# Patient Record
Sex: FEMALE | Race: WHITE | Employment: UNEMPLOYED | URBAN - NONMETROPOLITAN AREA
[De-identification: names, ages, dates, MRNs, and addresses within clinical notes are randomized per-mention and may not be internally consistent; named-entity substitution may affect disease eponyms.]

---

## 2022-01-15 ENCOUNTER — HOSPITAL ENCOUNTER (INPATIENT)
Age: 82
LOS: 3 days | Discharge: HOSPICE | DRG: 811 | End: 2022-01-18
Attending: EMERGENCY MEDICINE | Admitting: EMERGENCY MEDICINE
Payer: MEDICARE

## 2022-01-15 ENCOUNTER — APPOINTMENT (OUTPATIENT)
Dept: GENERAL RADIOLOGY | Age: 82
DRG: 811 | End: 2022-01-15
Attending: EMERGENCY MEDICINE
Payer: MEDICARE

## 2022-01-15 DIAGNOSIS — D64.9 ANEMIA, UNSPECIFIED TYPE: Primary | ICD-10-CM

## 2022-01-15 DIAGNOSIS — E87.6 HYPOKALEMIA: ICD-10-CM

## 2022-01-15 DIAGNOSIS — E16.2 HYPOGLYCEMIA: ICD-10-CM

## 2022-01-15 DIAGNOSIS — R41.82 ALTERED MENTAL STATUS, UNSPECIFIED ALTERED MENTAL STATUS TYPE: ICD-10-CM

## 2022-01-15 DIAGNOSIS — E86.0 DEHYDRATION: ICD-10-CM

## 2022-01-15 LAB
ABO + RH BLD: NORMAL
ALBUMIN SERPL-MCNC: 2 G/DL (ref 3.5–5)
ALBUMIN SERPL-MCNC: <0.6 G/DL (ref 3.5–5)
ALBUMIN/GLOB SERPL: 0.6 {RATIO} (ref 1.1–2.2)
ALBUMIN/GLOB SERPL: ABNORMAL {RATIO} (ref 1.1–2.2)
ALP SERPL-CCNC: 110 U/L (ref 45–117)
ALP SERPL-CCNC: 38 U/L (ref 45–117)
ALT SERPL-CCNC: 12 U/L (ref 12–78)
ALT SERPL-CCNC: <6 U/L (ref 12–78)
AMORPH CRY URNS QL MICRO: ABNORMAL
ANION GAP SERPL CALC-SCNC: 13 MMOL/L (ref 5–15)
ANION GAP SERPL CALC-SCNC: 16 MMOL/L (ref 5–15)
APPEARANCE UR: CLEAR
AST SERPL W P-5'-P-CCNC: 21 U/L (ref 15–37)
AST SERPL W P-5'-P-CCNC: 7 U/L (ref 15–37)
BACTERIA URNS QL MICRO: ABNORMAL /HPF
BASOPHILS # BLD: 0 K/UL (ref 0–0.2)
BASOPHILS # BLD: 0.1 K/UL (ref 0–0.2)
BASOPHILS NFR BLD: 0 % (ref 0–2.5)
BASOPHILS NFR BLD: 1 % (ref 0–2.5)
BILIRUB SERPL-MCNC: 0.5 MG/DL (ref 0.2–1)
BILIRUB SERPL-MCNC: <0.1 MG/DL (ref 0.2–1)
BILIRUB UR QL: NEGATIVE
BLOOD GROUP ANTIBODIES SERPL: NEGATIVE
BUN SERPL-MCNC: 10 MG/DL (ref 6–20)
BUN SERPL-MCNC: 24 MG/DL (ref 6–20)
BUN/CREAT SERPL: 23 (ref 12–20)
BUN/CREAT SERPL: 40 (ref 12–20)
CA-I BLD-MCNC: 6.6 MG/DL (ref 8.5–10.1)
CA-I BLD-MCNC: <5 MG/DL (ref 8.5–10.1)
CHLORIDE SERPL-SCNC: 106 MMOL/L (ref 97–108)
CHLORIDE SERPL-SCNC: 123 MMOL/L (ref 97–108)
CO2 SERPL-SCNC: 12 MMOL/L (ref 21–32)
CO2 SERPL-SCNC: 24 MMOL/L (ref 21–32)
COLOR UR: ABNORMAL
CREAT SERPL-MCNC: 0.25 MG/DL (ref 0.55–1.02)
CREAT SERPL-MCNC: 1.06 MG/DL (ref 0.55–1.02)
EOSINOPHIL # BLD: 0 K/UL (ref 0–0.7)
EOSINOPHIL # BLD: 0.1 K/UL (ref 0–0.7)
EOSINOPHIL NFR BLD: 1 % (ref 0.9–2.9)
EOSINOPHIL NFR BLD: 1 % (ref 0.9–2.9)
ERYTHROCYTE [DISTWIDTH] IN BLOOD BY AUTOMATED COUNT: 13.7 % (ref 11.5–14.5)
ERYTHROCYTE [DISTWIDTH] IN BLOOD BY AUTOMATED COUNT: 13.9 % (ref 11.5–14.5)
GLOBULIN SER CALC-MCNC: 3.2 G/DL (ref 2–4)
GLOBULIN SER CALC-MCNC: ABNORMAL G/DL (ref 2–4)
GLUCOSE BLD STRIP.AUTO-MCNC: 311 MG/DL (ref 65–117)
GLUCOSE SERPL-MCNC: 188 MG/DL (ref 65–100)
GLUCOSE SERPL-MCNC: 45 MG/DL (ref 65–100)
GLUCOSE UR STRIP.AUTO-MCNC: NEGATIVE MG/DL
HCT VFR BLD AUTO: 17.9 % (ref 36–46)
HCT VFR BLD AUTO: 38.6 % (ref 36–46)
HGB BLD-MCNC: 13.5 G/DL (ref 13.5–17.5)
HGB BLD-MCNC: 6 G/DL (ref 13.5–17.5)
HGB UR QL STRIP: ABNORMAL
KETONES UR QL STRIP.AUTO: NEGATIVE MG/DL
LACTATE SERPL-SCNC: 0.7 MMOL/L (ref 0.4–2)
LEUKOCYTE ESTERASE UR QL STRIP.AUTO: ABNORMAL
LYMPHOCYTES # BLD: 0.5 K/UL (ref 1–4.8)
LYMPHOCYTES # BLD: 0.9 K/UL (ref 1–4.8)
LYMPHOCYTES NFR BLD: 11 % (ref 20.5–51.1)
LYMPHOCYTES NFR BLD: 11 % (ref 20.5–51.1)
MCH RBC QN AUTO: 31.9 PG (ref 31–34)
MCH RBC QN AUTO: 32.2 PG (ref 31–34)
MCHC RBC AUTO-ENTMCNC: 33.4 G/DL (ref 31–36)
MCHC RBC AUTO-ENTMCNC: 34.9 G/DL (ref 31–36)
MCV RBC AUTO: 92.2 FL (ref 80–100)
MCV RBC AUTO: 95.5 FL (ref 80–100)
MONOCYTES # BLD: 0.4 K/UL (ref 0.2–2.4)
MONOCYTES # BLD: 0.9 K/UL (ref 0.2–2.4)
MONOCYTES NFR BLD: 10 % (ref 1.7–9.3)
MONOCYTES NFR BLD: 9 % (ref 1.7–9.3)
NEUTS SEG # BLD: 3.2 K/UL (ref 1.8–7.7)
NEUTS SEG # BLD: 6.5 K/UL (ref 1.8–7.7)
NEUTS SEG NFR BLD: 77 % (ref 42–75)
NEUTS SEG NFR BLD: 79 % (ref 42–75)
NITRITE UR QL STRIP.AUTO: POSITIVE
NRBC # BLD: 0.01 K/UL
NRBC # BLD: 0.03 K/UL
NRBC BLD-RTO: 0.2 PER 100 WBC
NRBC BLD-RTO: 0.8 PER 100 WBC
PERFORMED BY, TECHID: ABNORMAL
PH UR STRIP: 6 [PH] (ref 5–8)
PLATELET # BLD AUTO: 151 K/UL (ref 150–400)
PLATELET # BLD AUTO: 75 K/UL (ref 150–400)
PMV BLD AUTO: 8.4 FL (ref 6.5–11.5)
PMV BLD AUTO: 8.8 FL (ref 6.5–11.5)
POTASSIUM SERPL-SCNC: 3.1 MMOL/L (ref 3.5–5.1)
POTASSIUM SERPL-SCNC: <1 MMOL/L (ref 3.5–5.1)
PROT SERPL-MCNC: 5.2 G/DL (ref 6.4–8.2)
PROT SERPL-MCNC: <2 G/DL (ref 6.4–8.2)
PROT UR STRIP-MCNC: NEGATIVE MG/DL
RBC # BLD AUTO: 1.87 M/UL (ref 4.5–5.9)
RBC # BLD AUTO: 4.19 M/UL (ref 4.5–5.9)
RBC #/AREA URNS HPF: ABNORMAL /HPF (ref 0–3)
SODIUM SERPL-SCNC: 143 MMOL/L (ref 136–145)
SODIUM SERPL-SCNC: 151 MMOL/L (ref 136–145)
SP GR UR REFRACTOMETRY: 1.02 (ref 1–1.03)
SPECIMEN EXP DATE BLD: NORMAL
TROPONIN-HIGH SENSITIVITY: 29 NG/L (ref 0–51)
UROBILINOGEN UR QL STRIP.AUTO: 1 EU/DL (ref 0.2–1)
WBC # BLD AUTO: 4.1 K/UL (ref 4.4–11.3)
WBC # BLD AUTO: 8.4 K/UL (ref 4.4–11.3)
WBC URNS QL MICRO: ABNORMAL /HPF (ref 0–5)

## 2022-01-15 PROCEDURE — 81001 URINALYSIS AUTO W/SCOPE: CPT

## 2022-01-15 PROCEDURE — 87077 CULTURE AEROBIC IDENTIFY: CPT

## 2022-01-15 PROCEDURE — 96375 TX/PRO/DX INJ NEW DRUG ADDON: CPT

## 2022-01-15 PROCEDURE — 86900 BLOOD TYPING SEROLOGIC ABO: CPT

## 2022-01-15 PROCEDURE — 74011000250 HC RX REV CODE- 250: Performed by: EMERGENCY MEDICINE

## 2022-01-15 PROCEDURE — 74011250637 HC RX REV CODE- 250/637: Performed by: EMERGENCY MEDICINE

## 2022-01-15 PROCEDURE — 99284 EMERGENCY DEPT VISIT MOD MDM: CPT

## 2022-01-15 PROCEDURE — 82962 GLUCOSE BLOOD TEST: CPT

## 2022-01-15 PROCEDURE — 83605 ASSAY OF LACTIC ACID: CPT

## 2022-01-15 PROCEDURE — 74011250636 HC RX REV CODE- 250/636: Performed by: EMERGENCY MEDICINE

## 2022-01-15 PROCEDURE — 85025 COMPLETE CBC W/AUTO DIFF WBC: CPT

## 2022-01-15 PROCEDURE — 87086 URINE CULTURE/COLONY COUNT: CPT

## 2022-01-15 PROCEDURE — 71045 X-RAY EXAM CHEST 1 VIEW: CPT

## 2022-01-15 PROCEDURE — 96365 THER/PROPH/DIAG IV INF INIT: CPT

## 2022-01-15 PROCEDURE — 93005 ELECTROCARDIOGRAM TRACING: CPT

## 2022-01-15 PROCEDURE — 65270000029 HC RM PRIVATE

## 2022-01-15 PROCEDURE — 87186 SC STD MICRODIL/AGAR DIL: CPT

## 2022-01-15 PROCEDURE — 84484 ASSAY OF TROPONIN QUANT: CPT

## 2022-01-15 PROCEDURE — 80053 COMPREHEN METABOLIC PANEL: CPT

## 2022-01-15 RX ORDER — ACETAMINOPHEN 650 MG/1
650 SUPPOSITORY RECTAL
Status: DISCONTINUED | OUTPATIENT
Start: 2022-01-15 | End: 2022-01-18 | Stop reason: HOSPADM

## 2022-01-15 RX ORDER — ACETAMINOPHEN 325 MG/1
650 TABLET ORAL
Status: DISCONTINUED | OUTPATIENT
Start: 2022-01-15 | End: 2022-01-18 | Stop reason: HOSPADM

## 2022-01-15 RX ORDER — POTASSIUM CHLORIDE 7.45 MG/ML
10 INJECTION INTRAVENOUS
Status: COMPLETED | OUTPATIENT
Start: 2022-01-15 | End: 2022-01-15

## 2022-01-15 RX ORDER — ONDANSETRON 2 MG/ML
4 INJECTION INTRAMUSCULAR; INTRAVENOUS
Status: DISCONTINUED | OUTPATIENT
Start: 2022-01-15 | End: 2022-01-18 | Stop reason: HOSPADM

## 2022-01-15 RX ORDER — SODIUM CHLORIDE 0.9 % (FLUSH) 0.9 %
5-40 SYRINGE (ML) INJECTION AS NEEDED
Status: DISCONTINUED | OUTPATIENT
Start: 2022-01-15 | End: 2022-01-18 | Stop reason: HOSPADM

## 2022-01-15 RX ORDER — POLYETHYLENE GLYCOL 3350 17 G/17G
17 POWDER, FOR SOLUTION ORAL DAILY PRN
Status: DISCONTINUED | OUTPATIENT
Start: 2022-01-15 | End: 2022-01-18 | Stop reason: HOSPADM

## 2022-01-15 RX ORDER — DEXTROSE 50 % IN WATER (D50W) INTRAVENOUS SYRINGE
25
Status: COMPLETED | OUTPATIENT
Start: 2022-01-15 | End: 2022-01-15

## 2022-01-15 RX ORDER — ENOXAPARIN SODIUM 100 MG/ML
40 INJECTION SUBCUTANEOUS DAILY
Status: DISCONTINUED | OUTPATIENT
Start: 2022-01-16 | End: 2022-01-18 | Stop reason: HOSPADM

## 2022-01-15 RX ORDER — POTASSIUM CHLORIDE 20 MEQ/1
40 TABLET, EXTENDED RELEASE ORAL
Status: COMPLETED | OUTPATIENT
Start: 2022-01-15 | End: 2022-01-15

## 2022-01-15 RX ORDER — ONDANSETRON 4 MG/1
4 TABLET, ORALLY DISINTEGRATING ORAL
Status: DISCONTINUED | OUTPATIENT
Start: 2022-01-15 | End: 2022-01-18 | Stop reason: HOSPADM

## 2022-01-15 RX ORDER — SODIUM CHLORIDE 9 MG/ML
250 INJECTION, SOLUTION INTRAVENOUS AS NEEDED
Status: DISCONTINUED | OUTPATIENT
Start: 2022-01-15 | End: 2022-01-16

## 2022-01-15 RX ORDER — SODIUM CHLORIDE 0.9 % (FLUSH) 0.9 %
5-40 SYRINGE (ML) INJECTION EVERY 8 HOURS
Status: DISCONTINUED | OUTPATIENT
Start: 2022-01-15 | End: 2022-01-18 | Stop reason: HOSPADM

## 2022-01-15 RX ADMIN — DEXTROSE MONOHYDRATE 25 G: 25 INJECTION, SOLUTION INTRAVENOUS at 18:19

## 2022-01-15 RX ADMIN — POTASSIUM CHLORIDE 10 MEQ: 7.46 INJECTION, SOLUTION INTRAVENOUS at 18:29

## 2022-01-15 RX ADMIN — SODIUM CHLORIDE, PRESERVATIVE FREE 10 ML: 5 INJECTION INTRAVENOUS at 21:18

## 2022-01-15 RX ADMIN — POTASSIUM CHLORIDE 40 MEQ: 1500 TABLET, EXTENDED RELEASE ORAL at 21:16

## 2022-01-15 NOTE — ED PROVIDER NOTES
EMERGENCY DEPARTMENT HISTORY AND PHYSICAL EXAM      Date: (Not on file)  Patient Name: Iain Read    History of Presenting Illness     Chief Complaint   Patient presents with    Bladder Infection     pt presents with c/o of urinary incontinence, and fequal incontinence. Pt VS stable, pt has pmh of COPD has not been taking medications at home as prescribed. Pt family believes pt has urinary infection. Pt afebrile and 97% on room air       History Provided By: Patient    HPI: Iain Read, [de-identified] y.o. female with a past medical history significant No significant past medical history presents to the ED with cc of bladder infection and fecal incont. Patient's family thinks patient has a urinary track infection. No fevers or chills, Chest pain or SOB. Patient has been refusing to allow anyone in the house to assist her. There are no other complaints, changes, or physical findings at this time. PCP: No primary care provider on file. No current facility-administered medications on file prior to encounter. No current outpatient medications on file prior to encounter. Past History     Past Medical History:  No past medical history on file. Past Surgical History:  No past surgical history on file. Family History:  No family history on file. Social History:  Social History     Tobacco Use    Smoking status: Not on file    Smokeless tobacco: Not on file   Substance Use Topics    Alcohol use: Not on file    Drug use: Not on file       Allergies:  Not on File      Review of Systems     Review of Systems   Unable to perform ROS: Mental status change       Physical Exam     Physical Exam  Vitals and nursing note reviewed. Constitutional:       Appearance: Normal appearance. She is ill-appearing. HENT:      Head: Normocephalic.       Right Ear: Tympanic membrane normal.      Left Ear: Tympanic membrane normal.      Nose: Nose normal.      Mouth/Throat:      Mouth: Mucous membranes are moist.   Eyes:      Pupils: Pupils are equal, round, and reactive to light. Cardiovascular:      Rate and Rhythm: Regular rhythm. Tachycardia present. Pulses: Normal pulses. Pulmonary:      Effort: Pulmonary effort is normal.   Abdominal:      General: Abdomen is flat. Palpations: Abdomen is soft. Musculoskeletal:         General: Normal range of motion. Cervical back: Normal range of motion and neck supple. Skin:     Capillary Refill: Capillary refill takes less than 2 seconds. Comments: Excoriation on back and left thing and right buttocks   Neurological:      General: No focal deficit present. Mental Status: She is alert. Psychiatric:         Mood and Affect: Mood normal.         Lab and Diagnostic Study Results     Labs -   No results found for this or any previous visit (from the past 12 hour(s)). Radiologic Studies -   @lastxrresult@  CT Results  (Last 48 hours)    None        CXR Results  (Last 48 hours)    None            Medical Decision Making   - I am the first provider for this patient. - I reviewed the vital signs, available nursing notes, past medical history, past surgical history, family history and social history. - Initial assessment performed. The patients presenting problems have been discussed, and they are in agreement with the care plan formulated and outlined with them. I have encouraged them to ask questions as they arise throughout their visit. Vital Signs-Reviewed the potassium is less than she should be having arrhythmias given vital signs. No data found.     Records Reviewed: Nursing Notes    The patient presents with altered mental status with a differential diagnosis of  cerebral hemorrhage, chronic dementia, CVA, CVA/TIA, encephalopathy, epidural hematoma, hepatic encephalopathy, hypernatremia, hyponatremia, hypoxia, insulin reaction, ICB, UTI and volume depletion      ED Course:   Unable to obtain Ct Scan of head secondary  to the machine is down until tomorrow. Provider Notes (Medical Decision Making):     MDM  Number of Diagnoses or Management Options  Altered mental status, unspecified altered mental status type: new, needed workup  Anemia, unspecified type: new, needed workup  Dehydration: new, needed workup  Hypoglycemia: new, needed workup  Hypokalemia: new, needed workup     Amount and/or Complexity of Data Reviewed  Clinical lab tests: ordered and reviewed  Tests in the radiology section of CPT®: ordered and reviewed  Discuss the patient with other providers: (Patient with Dr. Ami Loera  About hospital admission he agreed patient would warrant an hospital admission for further treatment and evaluation)    Risk of Complications, Morbidity, and/or Mortality  Presenting problems: high  Diagnostic procedures: high  Management options: high  General comments: Critical care mins 55: Review of labs, IV glycose, IV potassium, blood tranfusion, consultation with Dr. Maren Mott and admission. Patient Progress  Patient progress: stable         Procedures   Medical Decision Makingedical Decision Making  Performed by: Sanjuana Delgadillo MD  PROCEDURES:Procedures       Disposition   Disposition: Condition fair  Admitted to Floor Medical Floor the case was discussed with the admitting physician Dr. Estephanie Lux:  1. There are no discharge medications for this patient. 2.   Follow-up Information    None       3. Return to ED if worse   4. There are no discharge medications for this patient. Diagnosis     Clinical Impression:    ICD-10-CM ICD-9-CM    1. Anemia, unspecified type  D64.9 285.9     severe   2. Hypokalemia  E87.6 276.8    3. Hypoglycemia  E16.2 251.2    4. Altered mental status, unspecified altered mental status type  R41.82 780.97    5. Dehydration  E86.0 276.51      Attestations:    Sanjuana Delgadillo MD    Please note that this dictation was completed with "[employer] the [employer] voice recognition software. Quite often unanticipated grammatical, syntax, homophones, and other interpretive errors are inadvertently transcribed by the computer software. Please disregard these errors. Please excuse any errors that have escaped final proofreading. Thank you.

## 2022-01-15 NOTE — ED TRIAGE NOTES
.  Chief Complaint   Patient presents with    Bladder Infection     pt presents with c/o of urinary incontinence, and fequal incontinence. Pt VS stable, pt has pmh of COPD has not been taking medications at home as prescribed. Pt family believes pt has urinary infection.  Pt afebrile and 97% on room air

## 2022-01-16 ENCOUNTER — APPOINTMENT (OUTPATIENT)
Dept: CT IMAGING | Age: 82
DRG: 811 | End: 2022-01-16
Attending: INTERNAL MEDICINE
Payer: MEDICARE

## 2022-01-16 LAB — MAGNESIUM SERPL-MCNC: 1.4 MG/DL (ref 1.6–2.4)

## 2022-01-16 PROCEDURE — 65270000029 HC RM PRIVATE

## 2022-01-16 PROCEDURE — 74011250637 HC RX REV CODE- 250/637: Performed by: INTERNAL MEDICINE

## 2022-01-16 PROCEDURE — 87040 BLOOD CULTURE FOR BACTERIA: CPT

## 2022-01-16 PROCEDURE — 74011000250 HC RX REV CODE- 250: Performed by: EMERGENCY MEDICINE

## 2022-01-16 PROCEDURE — 74011000258 HC RX REV CODE- 258: Performed by: INTERNAL MEDICINE

## 2022-01-16 PROCEDURE — 74011250636 HC RX REV CODE- 250/636: Performed by: EMERGENCY MEDICINE

## 2022-01-16 PROCEDURE — 70450 CT HEAD/BRAIN W/O DYE: CPT

## 2022-01-16 PROCEDURE — 36415 COLL VENOUS BLD VENIPUNCTURE: CPT

## 2022-01-16 PROCEDURE — 74011250636 HC RX REV CODE- 250/636: Performed by: INTERNAL MEDICINE

## 2022-01-16 PROCEDURE — 83735 ASSAY OF MAGNESIUM: CPT

## 2022-01-16 RX ORDER — POTASSIUM CHLORIDE 20 MEQ/1
20 TABLET, EXTENDED RELEASE ORAL 2 TIMES DAILY WITH MEALS
Status: DISCONTINUED | OUTPATIENT
Start: 2022-01-16 | End: 2022-01-18 | Stop reason: HOSPADM

## 2022-01-16 RX ORDER — SODIUM CHLORIDE 9 MG/ML
75 INJECTION, SOLUTION INTRAVENOUS AS NEEDED
Status: DISCONTINUED | OUTPATIENT
Start: 2022-01-16 | End: 2022-01-17

## 2022-01-16 RX ORDER — TRIAMCINOLONE ACETONIDE 1 MG/G
CREAM TOPICAL 2 TIMES DAILY
Status: DISCONTINUED | OUTPATIENT
Start: 2022-01-16 | End: 2022-01-18 | Stop reason: HOSPADM

## 2022-01-16 RX ORDER — AMLODIPINE BESYLATE 2.5 MG/1
2.5 TABLET ORAL DAILY
Status: DISCONTINUED | OUTPATIENT
Start: 2022-01-16 | End: 2022-01-18 | Stop reason: HOSPADM

## 2022-01-16 RX ADMIN — POTASSIUM CHLORIDE 20 MEQ: 1500 TABLET, EXTENDED RELEASE ORAL at 11:55

## 2022-01-16 RX ADMIN — ENOXAPARIN SODIUM 40 MG: 100 INJECTION SUBCUTANEOUS at 09:03

## 2022-01-16 RX ADMIN — SODIUM CHLORIDE, PRESERVATIVE FREE 10 ML: 5 INJECTION INTRAVENOUS at 21:27

## 2022-01-16 RX ADMIN — CEFTRIAXONE SODIUM 1 G: 1 INJECTION, POWDER, FOR SOLUTION INTRAMUSCULAR; INTRAVENOUS at 11:24

## 2022-01-16 RX ADMIN — TRIAMCINOLONE ACETONIDE: 1 CREAM TOPICAL at 11:54

## 2022-01-16 RX ADMIN — TRIAMCINOLONE ACETONIDE: 1 CREAM TOPICAL at 21:26

## 2022-01-16 RX ADMIN — AMLODIPINE BESYLATE 2.5 MG: 2.5 TABLET ORAL at 11:54

## 2022-01-16 RX ADMIN — POTASSIUM CHLORIDE 20 MEQ: 1500 TABLET, EXTENDED RELEASE ORAL at 16:40

## 2022-01-16 RX ADMIN — SODIUM CHLORIDE, PRESERVATIVE FREE 10 ML: 5 INJECTION INTRAVENOUS at 14:29

## 2022-01-16 RX ADMIN — SODIUM CHLORIDE, PRESERVATIVE FREE 10 ML: 5 INJECTION INTRAVENOUS at 05:45

## 2022-01-16 NOTE — ROUTINE PROCESS
Down for CT scan. Son been in to visit this morning. Monitor on. Wheezes noted. Sores to thighs with multiple lesions noted. Scarring to back, healed lesions. Camp in place. ate well. No c/o.

## 2022-01-16 NOTE — PROGRESS NOTES
Problem: Falls - Risk of  Goal: *Absence of Falls  Description: Document Chelsey López Fall Risk and appropriate interventions in the flowsheet. Outcome: Progressing Towards Goal  Note: Fall Risk Interventions:  Mobility Interventions: Bed/chair exit alarm,Communicate number of staff needed for ambulation/transfer,Patient to call before getting OOB,PT Consult for mobility concerns              Elimination Interventions: Call light in reach,Patient to call for help with toileting needs,Toileting schedule/hourly rounds              Problem: Patient Education: Go to Patient Education Activity  Goal: Patient/Family Education  Outcome: Progressing Towards Goal     Problem: Pressure Injury - Risk of  Goal: *Prevention of pressure injury  Description: Document Jose Luis Scale and appropriate interventions in the flowsheet. Outcome: Progressing Towards Goal  Note: Pressure Injury Interventions:  Sensory Interventions: Keep linens dry and wrinkle-free,Maintain/enhance activity level,Minimize linen layers,Monitor skin under medical devices,Pressure redistribution bed/mattress (bed type),Assess changes in LOC,Assess need for specialty bed,Turn and reposition approx. every two hours (pillows and wedges if needed)    Moisture Interventions: Absorbent underpads,Apply protective barrier, creams and emollients,Check for incontinence Q2 hours and as needed,Internal/External urinary devices,Maintain skin hydration (lotion/cream),Minimize layers,Moisture barrier,Offer toileting Q_hr    Activity Interventions: PT/OT evaluation    Mobility Interventions: Float heels,HOB 30 degrees or less,Turn and reposition approx.  every two hours(pillow and wedges)    Nutrition Interventions: Document food/fluid/supplement intake                     Problem: Patient Education: Go to Patient Education Activity  Goal: Patient/Family Education  Outcome: Progressing Towards Goal     Problem: Airway Clearance - Ineffective  Goal: *Patent airway  Outcome: Progressing Towards Goal     Problem: Airway Clearance - Ineffective  Goal: *Absence of airway secretions  Outcome: Progressing Towards Goal     Problem: Airway Clearance - Ineffective  Goal: *Able to cough effectively  Outcome: Progressing Towards Goal     Problem: Impaired Skin Integrity/Pressure Injury Treatment  Goal: *Improvement of Existing Pressure Injury  Outcome: Progressing Towards Goal     Problem: Impaired Skin Integrity/Pressure Injury Treatment  Goal: *Prevention of pressure injury  Description: Document Jose Luis Scale and appropriate interventions in the flowsheet. Outcome: Progressing Towards Goal  Variance Patient Condition  Impact: Moderate  Note: Pressure Injury Interventions:  Sensory Interventions: Keep linens dry and wrinkle-free,Maintain/enhance activity level,Minimize linen layers,Monitor skin under medical devices,Pressure redistribution bed/mattress (bed type),Assess changes in LOC,Assess need for specialty bed,Turn and reposition approx. every two hours (pillows and wedges if needed)    Moisture Interventions: Absorbent underpads,Apply protective barrier, creams and emollients,Check for incontinence Q2 hours and as needed,Internal/External urinary devices,Maintain skin hydration (lotion/cream),Minimize layers,Moisture barrier,Offer toileting Q_hr    Activity Interventions: PT/OT evaluation    Mobility Interventions: Float heels,HOB 30 degrees or less,Turn and reposition approx.  every two hours(pillow and wedges)    Nutrition Interventions: Document food/fluid/supplement intake                     Problem: Patient Education: Go to Patient Education Activity  Goal: Patient/Family Education  Outcome: Progressing Towards Goal

## 2022-01-16 NOTE — H&P
History and Physical    Subjective:     Sheri Larios is a [de-identified] y.o. female with history of COPD who presented from home with reports of fecal and urinary incontinence and concerns for UTI. Attempted to call family but no response so history obtained from ER records as patient is not a reliable historian, oriented to person and place but not to time and giving answers to questions that are unrelated to where she is asked. As per ER documentation family is concerned that patient has a urinary tract infection and it is reported that she is not allowing anyone in the house to assist her. In the ER patient was noted to have hemoglobin of 6 along with a severely low potassium of 1 as well as a urinary tract infections was admitted for same. I did get to speak to the patient's son Sánchez Bolanos who reported to me that for the past 3 years patient has been out of the hospital twice because of COVID and patient has been much given up not wanting to do anything, go anywhere or deal with anybody and instead stay at home watching TV and a lot of times not eating. Patient's son thinks that her mentation has been pretty good until probably about 2 weeks ago when she would at times be disoriented. History reviewed. No pertinent past medical history. History reviewed. No pertinent surgical history. History reviewed. No pertinent family history. Social History     Tobacco Use    Smoking status: Former Smoker    Smokeless tobacco: Never Used   Substance Use Topics    Alcohol use: Not on file       Prior to Admission medications    Not on File     Allergies   Allergen Reactions    Penicillins Hives        Review of Systems:  Review of Systems   Constitutional: Negative for activity change, appetite change, chills, diaphoresis, fatigue and fever. HENT: Negative for congestion and rhinorrhea. Respiratory: Negative for cough, choking, chest tightness, shortness of breath and stridor. Gastrointestinal: Negative for abdominal pain, anal bleeding, blood in stool, constipation, diarrhea and vomiting. Objective:     Vitals:  Visit Vitals  BP (!) 179/82   Pulse 84   Temp 97.7 °F (36.5 °C)   Resp 22   Ht 5' 10\" (1.778 m)   Wt 74.5 kg (164 lb 3.2 oz)   SpO2 98%   BMI 23.56 kg/m²       Physical Exam:  General: Alert, cooperative, no distress. Head:  Normocephalic, without obvious abnormality, atraumatic. Eyes:  Conjunctivae/corneas clear. Pupils equal, round, reactive to light. Extraocular movements intact. Lungs:  Clear to auscultation bilaterally, no wheezes, crackles  Chest wall: No tenderness or deformity. Heart:  Regular rate and rhythm, S1, S2 normal, no murmur, click, rub, or gallop. Abdomen:   Soft, non-tender. Bowel sounds normal. No masses. No organomegaly. Back:  No spine tenderness to palpation  Extremities: Extremities normal, atraumatic, no cyanosis or edema. Pulses: Symmetric all extremities.   Neurologic: Awake and oriented to person and place but not to time or situation  Skin: Multiple hyperpigmented areas on back and other areas of body buttocks and thighs with multiple hyperemic macules with some small superficial ulcerations      Labs:  Recent Results (from the past 24 hour(s))   EKG, 12 LEAD, INITIAL    Collection Time: 01/15/22  5:15 PM   Result Value Ref Range    Ventricular Rate 100 BPM    Atrial Rate 105 BPM    P-R Interval 129 ms    QRS Duration 96 ms    Q-T Interval 359 ms    QTC Calculation (Bezet) 463 ms    Calculated P Axis 76 degrees    Calculated R Axis 61 degrees    Calculated T Axis 73 degrees    Diagnosis       Sinus tachycardia  Supraventricular bigeminy  Right atrial enlargement  Borderline ST depression, anterolateral leads  ST depression V1-V3, suggest recording posterior leads     CBC WITH AUTOMATED DIFF    Collection Time: 01/15/22  5:29 PM   Result Value Ref Range    WBC 4.1 (L) 4.4 - 11.3 K/uL    RBC 1.87 (L) 4.50 - 5.90 M/uL    HGB 6.0 (L) 13.5 - 17.5 g/dL    HCT 17.9 (LL) 36 - 46 %    MCV 95.5 80 - 100 FL    MCH 31.9 31 - 34 PG    MCHC 33.4 31.0 - 36.0 g/dL    RDW 13.7 11.5 - 14.5 %    PLATELET 75 (L) 662 - 400 K/uL    MPV 8.4 6.5 - 11.5 FL    NRBC 0.8  WBC    ABSOLUTE NRBC 0.03 K/uL    NEUTROPHILS 79 (H) 42 - 75 %    LYMPHOCYTES 11 (L) 20.5 - 51.1 %    MONOCYTES 9 1.7 - 9.3 %    EOSINOPHILS 1 0.9 - 2.9 %    BASOPHILS 0 0.0 - 2.5 %    ABS. NEUTROPHILS 3.2 1.8 - 7.7 K/UL    ABS. LYMPHOCYTES 0.5 (L) 1.0 - 4.8 K/UL    ABS. MONOCYTES 0.4 0.2 - 2.4 K/UL    ABS. EOSINOPHILS 0.0 0.0 - 0.7 K/UL    ABS. BASOPHILS 0.0 0.0 - 0.2 K/UL   METABOLIC PANEL, COMPREHENSIVE    Collection Time: 01/15/22  5:29 PM   Result Value Ref Range    Sodium 151 (H) 136 - 145 mmol/L    Potassium <1.0 (LL) 3.5 - 5.1 mmol/L    Chloride 123 (H) 97 - 108 mmol/L    CO2 12 (LL) 21 - 32 mmol/L    Anion gap 16 (H) 5 - 15 mmol/L    Glucose 45 (LL) 65 - 100 mg/dL    BUN 10 6 - 20 mg/dL    Creatinine 0.25 (L) 0.55 - 1.02 mg/dL    BUN/Creatinine ratio 40 (H) 12 - 20      GFR est AA >60 >60 ml/min/1.73m2    GFR est non-AA >60 >60 ml/min/1.73m2    Calcium <5.0 (LL) 8.5 - 10.1 mg/dL    Bilirubin, total <0.1 (L) 0.2 - 1.0 mg/dL    AST (SGOT) 7 (L) 15 - 37 U/L    ALT (SGPT) <6 (L) 12 - 78 U/L    Alk.  phosphatase 38 (L) 45 - 117 U/L    Protein, total <2.0 (L) 6.4 - 8.2 g/dL    Albumin <0.6 (L) 3.5 - 5.0 g/dL    Globulin Not calculated 2.0 - 4.0 g/dL    A-G Ratio Not calculated 1.1 - 2.2     TROPONIN-HIGH SENSITIVITY    Collection Time: 01/15/22  5:29 PM   Result Value Ref Range    Troponin-High Sensitivity 29 0 - 51 ng/L   LACTIC ACID    Collection Time: 01/15/22  5:29 PM   Result Value Ref Range    Lactic acid 0.7 0.4 - 2.0 mmol/L   URINALYSIS W/ RFLX MICROSCOPIC    Collection Time: 01/15/22  5:29 PM   Result Value Ref Range    Color Yellow/Straw      Appearance Clear Clear      Specific gravity 1.020 1.003 - 1.030      pH (UA) 6.0 5.0 - 8.0      Protein Negative Negative mg/dL    Glucose Negative Negative mg/dL    Ketone Negative Negative mg/dL    Bilirubin Negative Negative      Blood Trace (A) Negative      Urobilinogen 1.0 0.2 - 1.0 EU/dL    Nitrites Positive (A) Negative      Leukocyte Esterase Trace (A) Negative     URINE MICROSCOPIC    Collection Time: 01/15/22  5:29 PM   Result Value Ref Range    WBC 5-10 0 - 5 /hpf    RBC 0-5 0 - 3 /hpf    Bacteria 4+ (A) Negative /hpf    Amorphous Crystals 4+ (A) Negative   TYPE & SCREEN    Collection Time: 01/15/22  5:58 PM   Result Value Ref Range    Crossmatch Expiration 01/18/2022,2359     ABO/Rh(D) Chidi Goldy Positive     Antibody screen Negative    GLUCOSE, POC    Collection Time: 01/15/22  6:32 PM   Result Value Ref Range    Glucose (POC) 311 (H) 65 - 117 mg/dL    Performed by KIM FELIX    METABOLIC PANEL, COMPREHENSIVE    Collection Time: 01/15/22  7:20 PM   Result Value Ref Range    Sodium 143 136 - 145 mmol/L    Potassium 3.1 (L) 3.5 - 5.1 mmol/L    Chloride 106 97 - 108 mmol/L    CO2 24 21 - 32 mmol/L    Anion gap 13 5 - 15 mmol/L    Glucose 188 (H) 65 - 100 mg/dL    BUN 24 (H) 6 - 20 mg/dL    Creatinine 1.06 (H) 0.55 - 1.02 mg/dL    BUN/Creatinine ratio 23 (H) 12 - 20      GFR est AA >60 >60 ml/min/1.73m2    GFR est non-AA 50 (L) >60 ml/min/1.73m2    Calcium 6.6 (L) 8.5 - 10.1 mg/dL    Bilirubin, total 0.5 0.2 - 1.0 mg/dL    AST (SGOT) 21 15 - 37 U/L    ALT (SGPT) 12 12 - 78 U/L    Alk.  phosphatase 110 45 - 117 U/L    Protein, total 5.2 (L) 6.4 - 8.2 g/dL    Albumin 2.0 (L) 3.5 - 5.0 g/dL    Globulin 3.2 2.0 - 4.0 g/dL    A-G Ratio 0.6 (L) 1.1 - 2.2     CBC WITH AUTOMATED DIFF    Collection Time: 01/15/22  9:24 PM   Result Value Ref Range    WBC 8.4 4.4 - 11.3 K/uL    RBC 4.19 (L) 4.50 - 5.90 M/uL    HGB 13.5 13.5 - 17.5 g/dL    HCT 38.6 36 - 46 %    MCV 92.2 80 - 100 FL    MCH 32.2 31 - 34 PG    MCHC 34.9 31.0 - 36.0 g/dL    RDW 13.9 11.5 - 14.5 %    PLATELET 647 826 - 841 K/uL    MPV 8.8 6.5 - 11.5 FL    NRBC 0.2  WBC    ABSOLUTE NRBC 0.01 K/uL    NEUTROPHILS 77 (H) 42 - 75 %    LYMPHOCYTES 11 (L) 20.5 - 51.1 %    MONOCYTES 10 (H) 1.7 - 9.3 %    EOSINOPHILS 1 0.9 - 2.9 %    BASOPHILS 1 0.0 - 2.5 %    ABS. NEUTROPHILS 6.5 1.8 - 7.7 K/UL    ABS. LYMPHOCYTES 0.9 (L) 1.0 - 4.8 K/UL    ABS. MONOCYTES 0.9 0.2 - 2.4 K/UL    ABS. EOSINOPHILS 0.1 0.0 - 0.7 K/UL    ABS. BASOPHILS 0.1 0.0 - 0.2 K/UL       Imaging:  XR CHEST PORT    Result Date: 1/15/2022  Findings: A single portable AP view of the chest was obtained at 17:31 and compared with prior study dated January 14, 2017. External leads overlie the thorax Normal cardiac caliber. Mild prominence of the main pulmonary artery is stable.  Lungs adequately ventilated, with no edema, effusion or consolidation     No acute cardiopulmonary process       Assessment & Plan:     Urinary tract infection  -Treat with Rocephin  -Follow urine cultures  -Hydrate    Severe hypokalemia  -Potassium of 1 on admission with good response to supplementation now at 3.1  -EKG with bigeminy and sinus tachycardia  -Check mag level  -Replete potassium and continue to monitor    Altered mental status  -Only secondary to urinary tract infection  -Head CT    Elevated blood pressure  -BPs have been elevated  -Start antihypertensive agent    Skin lesions  -Rocephin for possible cellulitis  -Steroid cream to affected area    Anemia  -Etiology unclear but responded significantly well to 2 units with current hemoglobin of 13.5  -Monitor H&H    COPD  -Not an active issue at this time    CODE STATUS  -Patient is full code    Disposition  -Patient's son plans to take her home upon discharge  -Patient is full code      Prophylaxis: SCD and Lovenox 40 mg subcu daily     Electronically Signed :   Nanette Lopez MD, [facility]

## 2022-01-17 PROBLEM — N39.0 UTI (URINARY TRACT INFECTION): Status: ACTIVE | Noted: 2022-01-17

## 2022-01-17 LAB
ANION GAP SERPL CALC-SCNC: 9 MMOL/L (ref 5–15)
ATRIAL RATE: 105 BPM
BUN SERPL-MCNC: 22 MG/DL (ref 6–20)
BUN/CREAT SERPL: 27 (ref 12–20)
CA-I BLD-MCNC: 6.6 MG/DL (ref 8.5–10.1)
CALCULATED P AXIS, ECG09: 76 DEGREES
CALCULATED R AXIS, ECG10: 61 DEGREES
CALCULATED T AXIS, ECG11: 73 DEGREES
CHLORIDE SERPL-SCNC: 109 MMOL/L (ref 97–108)
CO2 SERPL-SCNC: 23 MMOL/L (ref 21–32)
CREAT SERPL-MCNC: 0.83 MG/DL (ref 0.55–1.02)
DIAGNOSIS, 93000: NORMAL
ERYTHROCYTE [DISTWIDTH] IN BLOOD BY AUTOMATED COUNT: 14.2 % (ref 11.5–14.5)
GLUCOSE SERPL-MCNC: 99 MG/DL (ref 65–100)
HCT VFR BLD AUTO: 36.2 % (ref 36–46)
HGB BLD-MCNC: 12.4 G/DL (ref 13.5–17.5)
MCH RBC QN AUTO: 31.7 PG (ref 31–34)
MCHC RBC AUTO-ENTMCNC: 34.2 G/DL (ref 31–36)
MCV RBC AUTO: 92.8 FL (ref 80–100)
NRBC # BLD: 0.01 K/UL
NRBC BLD-RTO: 0.1 PER 100 WBC
P-R INTERVAL, ECG05: 129 MS
PLATELET # BLD AUTO: 136 K/UL (ref 150–400)
PMV BLD AUTO: 8.9 FL (ref 6.5–11.5)
POTASSIUM SERPL-SCNC: 3.5 MMOL/L (ref 3.5–5.1)
Q-T INTERVAL, ECG07: 359 MS
QRS DURATION, ECG06: 96 MS
QTC CALCULATION (BEZET), ECG08: 463 MS
RBC # BLD AUTO: 3.9 M/UL (ref 4.5–5.9)
SODIUM SERPL-SCNC: 141 MMOL/L (ref 136–145)
VENTRICULAR RATE, ECG03: 100 BPM
WBC # BLD AUTO: 6.2 K/UL (ref 4.4–11.3)

## 2022-01-17 PROCEDURE — 83540 ASSAY OF IRON: CPT

## 2022-01-17 PROCEDURE — 74011250636 HC RX REV CODE- 250/636: Performed by: EMERGENCY MEDICINE

## 2022-01-17 PROCEDURE — 82272 OCCULT BLD FECES 1-3 TESTS: CPT

## 2022-01-17 PROCEDURE — 74011250637 HC RX REV CODE- 250/637: Performed by: INTERNAL MEDICINE

## 2022-01-17 PROCEDURE — 82607 VITAMIN B-12: CPT

## 2022-01-17 PROCEDURE — 74011000250 HC RX REV CODE- 250: Performed by: EMERGENCY MEDICINE

## 2022-01-17 PROCEDURE — 82746 ASSAY OF FOLIC ACID SERUM: CPT

## 2022-01-17 PROCEDURE — 74011250636 HC RX REV CODE- 250/636: Performed by: INTERNAL MEDICINE

## 2022-01-17 PROCEDURE — 74011000258 HC RX REV CODE- 258: Performed by: INTERNAL MEDICINE

## 2022-01-17 PROCEDURE — 74011250636 HC RX REV CODE- 250/636: Performed by: FAMILY MEDICINE

## 2022-01-17 PROCEDURE — 80048 BASIC METABOLIC PNL TOTAL CA: CPT

## 2022-01-17 PROCEDURE — 36415 COLL VENOUS BLD VENIPUNCTURE: CPT

## 2022-01-17 PROCEDURE — 65270000029 HC RM PRIVATE

## 2022-01-17 PROCEDURE — 83550 IRON BINDING TEST: CPT

## 2022-01-17 PROCEDURE — 85027 COMPLETE CBC AUTOMATED: CPT

## 2022-01-17 PROCEDURE — 74011250637 HC RX REV CODE- 250/637: Performed by: EMERGENCY MEDICINE

## 2022-01-17 RX ORDER — SODIUM CHLORIDE 9 MG/ML
25 INJECTION, SOLUTION INTRAVENOUS AS NEEDED
Status: DISCONTINUED | OUTPATIENT
Start: 2022-01-17 | End: 2022-01-18 | Stop reason: HOSPADM

## 2022-01-17 RX ORDER — MAGNESIUM SULFATE HEPTAHYDRATE 40 MG/ML
2 INJECTION, SOLUTION INTRAVENOUS ONCE
Status: COMPLETED | OUTPATIENT
Start: 2022-01-17 | End: 2022-01-17

## 2022-01-17 RX ADMIN — POTASSIUM CHLORIDE 20 MEQ: 1500 TABLET, EXTENDED RELEASE ORAL at 17:16

## 2022-01-17 RX ADMIN — AMLODIPINE BESYLATE 2.5 MG: 2.5 TABLET ORAL at 07:51

## 2022-01-17 RX ADMIN — MAGNESIUM SULFATE HEPTAHYDRATE 2 G: 2 INJECTION, SOLUTION INTRAVENOUS at 11:14

## 2022-01-17 RX ADMIN — POTASSIUM CHLORIDE 20 MEQ: 1500 TABLET, EXTENDED RELEASE ORAL at 07:50

## 2022-01-17 RX ADMIN — SODIUM CHLORIDE, PRESERVATIVE FREE 10 ML: 5 INJECTION INTRAVENOUS at 22:01

## 2022-01-17 RX ADMIN — TRIAMCINOLONE ACETONIDE: 1 CREAM TOPICAL at 08:00

## 2022-01-17 RX ADMIN — TRIAMCINOLONE ACETONIDE: 1 CREAM TOPICAL at 22:01

## 2022-01-17 RX ADMIN — SODIUM CHLORIDE, PRESERVATIVE FREE 10 ML: 5 INJECTION INTRAVENOUS at 05:55

## 2022-01-17 RX ADMIN — ENOXAPARIN SODIUM 40 MG: 100 INJECTION SUBCUTANEOUS at 07:51

## 2022-01-17 RX ADMIN — SODIUM CHLORIDE, PRESERVATIVE FREE 10 ML: 5 INJECTION INTRAVENOUS at 17:16

## 2022-01-17 RX ADMIN — ACETAMINOPHEN 650 MG: 325 TABLET ORAL at 22:00

## 2022-01-17 RX ADMIN — CEFTRIAXONE SODIUM 1 G: 1 INJECTION, POWDER, FOR SOLUTION INTRAMUSCULAR; INTRAVENOUS at 11:14

## 2022-01-17 NOTE — PROGRESS NOTES
Physician Progress Note      Macario MAYES #:                  [id number]  :                       1940  ADMIT DATE:       1/15/2022 5:05 PM  [address] [place] DATE:  RESPONDING  PROVIDER #:        Julia Wolfe MD        QUERY TEXT:    Type of Anemia: Please provide further specificity, if known. Clinical indicators include: hemoglobin, bleeding, blood in stool, rbc, hgb, hct, platelet, anemia, 2 units, h&h  Options provided:  -- Anemia due to acute blood loss  -- Anemia due to chronic blood loss  -- Anemia due to iron deficiency  -- Anemia due to postoperative blood loss  -- Anemia due to chronic disease  -- Other - I will add my own diagnosis  -- Disagree - Not applicable / Not valid  -- Disagree - Clinically Unable to determine / Unknown        PROVIDER RESPONSE TEXT:    Provider dismissed this query because it was not applicable to the patient or not a valid query. hemoglobin is stable          QUERY TEXT:    Dear Dr. Prabhu Nolan -    Pt admitted with UTI, hypokalemia, anemia, hyponatremia, hypocalcemia . Pt noted to have AMS. If possible, please document in the progress notes and discharge summary if you are evaluating and / or treating any of the following: The medical record reflects the following:  Risk Factors: [de-identified] F presented with incontinence, AMS, family concerned for UTI  Clinical Indicators: per attending documentation, patient with AMS r/t UTI; patient labs as follows -  WBC 4.1. [facility] 8.4  RBC 1.87. ..4.19  Hgb 6.0. [place] [facility] 13.5  Plt 75. [facility] 151  Na 151. [facility] 143  K <1.0. [facility] 3.1  C02 12. .. 24  creatinine 0.25. ..1.06  Ca <5.0.. [facility] 6. 6  Treatment: labs, IV fluids, IV Kcl, IV ABT    Thank you,  EDUARD Pagan, RN, [facility]  Clinical   [phone]  Options provided:  -- Metabolic encephalopathy  -- Septic encephalopathy  -- Delirium due to, Please specify cause.   -- Delirium  -- Other - I will add my own diagnosis  -- Disagree - Not applicable / Not valid  -- Disagree - Clinically unable to determine / Unknown  -- Refer to Clinical Documentation Reviewer    PROVIDER RESPONSE TEXT:    This patient has metabolic encephalopathy.     Query created by: Andrew Schmitz on 1/17/2022 6:11 AM      Electronically signed by:  Cherrie Arreaga MD 1/17/2022 8:41 AM

## 2022-01-17 NOTE — PROGRESS NOTES
Progress Note  Date:1/17/2022       Room:201[facility]  Patient Beckie Drake     YOB: 1940     Age:81 y.o. Subjective    [de-identified]year old PMH of COPD, who is unable to give any history, admitted for acute anemia hemoglobin of 6, hypokalemia  and UTI. Patient was evaluated, she was eating breakfast.  She reported she was feeling better. She could not tell me anything further. Review of Systems    Objective           Vitals Last 24 Hours:  Patient Vitals for the past 24 hrs:   Temp Pulse Resp BP SpO2   01/17/22 1128 97.7 °F (36.5 °C) 87 24 (!) 140/66 95 %   01/17/22 0754 97.6 °F (36.4 °C) 83 16 (!) 156/107 98 %   01/17/22 0410 97.5 °F (36.4 °C) 81 21 (!) 155/67 94 %   01/16/22 2350 97.7 °F (36.5 °C) 87 20 (!) 154/80 94 %   01/16/22 2000 97.5 °F (36.4 °C) (!) 102 20 (!) 141/87 98 %   01/16/22 1600  84      01/16/22 1339 98.2 °F (36.8 °C) 82 18 (!) 155/57 96 %        I/O (24Hr): Intake/Output Summary (Last 24 hours) at 1/17/2022 1248  Last data filed at 1/17/2022 0606  Gross per 24 hour   Intake 740 ml   Output 930 ml   Net -190 ml       Physical Exam:  General: Alert, cooperative, no distress, appears stated age. Head:  Normocephalic, without obvious abnormality, atraumatic. Eyes:  Conjunctivae/corneas clear. Extraocular movements intact. Lungs:  Clear to auscultation bilaterally. no wheeze, rales, crackles, rhonchi   Chest wall: No tenderness or deformity. Heart:  Regular rate and rhythm, S1, S2 normal, no murmur, click, rub or gallop. Abdomen:  Soft, non-tender. Bowel sounds normal. No masses,  No organomegaly. Extremities: Extremities normal, atraumatic, no cyanosis or edema. Pulses: 2+ and symmetric all extremities. Skin: Skin color, texture, turgor normal. No rashes or lesions  Neurologic: Awake, Alert, oriented to herself .  No obvious gross sensory or motor deficits      Medications           Current Facility-Administered Medications   Medication Dose Route Frequency    magnesium sulfate 2 g/50 ml IVPB (premix or compounded)  2 g IntraVENous ONCE    triamcinolone acetonide (KENALOG) 0.1 % cream   Topical BID    potassium chloride (K-DUR, KLOR-CON M20) SR tablet 20 mEq  20 mEq Oral BID WITH MEALS    0.9% sodium chloride infusion  75 mL/hr IntraVENous PRN    amLODIPine (NORVASC) tablet 2.5 mg  2.5 mg Oral DAILY    cefTRIAXone (ROCEPHIN) 1 g in 0.9% sodium chloride (MBP/ADV) 50 mL MBP  1 g IntraVENous Q24H    sodium chloride (NS) flush 5-40 mL  5-40 mL IntraVENous Q8H    sodium chloride (NS) flush 5-40 mL  5-40 mL IntraVENous PRN    acetaminophen (TYLENOL) tablet 650 mg  650 mg Oral Q6H PRN    Or    acetaminophen (TYLENOL) suppository 650 mg  650 mg Rectal Q6H PRN    polyethylene glycol (MIRALAX) packet 17 g  17 g Oral DAILY PRN    ondansetron (ZOFRAN ODT) tablet 4 mg  4 mg Oral Q8H PRN    Or    ondansetron (ZOFRAN) injection 4 mg  4 mg IntraVENous Q6H PRN    enoxaparin (LOVENOX) injection 40 mg  40 mg SubCUTAneous DAILY         Allergies         Penicillins       Labs/Imaging/Diagnostics      Labs:  Recent Results (from the past 48 hour(s))   EKG, 12 LEAD, INITIAL    Collection Time: 01/15/22  5:15 PM   Result Value Ref Range    Ventricular Rate 100 BPM    Atrial Rate 105 BPM    P-R Interval 129 ms    QRS Duration 96 ms    Q-T Interval 359 ms    QTC Calculation (Bezet) 463 ms    Calculated P Axis 76 degrees    Calculated R Axis 61 degrees    Calculated T Axis 73 degrees    Diagnosis       Sinus tachycardia  Supraventricular bigeminy  Right atrial enlargement  Borderline ST depression, anterolateral leads  ST depression V1-V3, suggest recording posterior leads    Confirmed by VIDAL ATWOOD ([id number]) on 1/17/2022 9:18:03 AM     CBC WITH AUTOMATED DIFF    Collection Time: 01/15/22  5:29 PM   Result Value Ref Range    WBC 4.1 (L) 4.4 - 11.3 K/uL    RBC 1.87 (L) 4.50 - 5.90 M/uL    HGB 6.0 (L) 13.5 - 17.5 g/dL    HCT 17.9 (LL) 36 - 46 %    MCV 95.5 80 - 100 FL    MCH 31.9 31 - 34 PG    MCHC 33.4 31.0 - 36.0 g/dL    RDW 13.7 11.5 - 14.5 %    PLATELET 75 (L) 410 - 400 K/uL    MPV 8.4 6.5 - 11.5 FL    NRBC 0.8  WBC    ABSOLUTE NRBC 0.03 K/uL    NEUTROPHILS 79 (H) 42 - 75 %    LYMPHOCYTES 11 (L) 20.5 - 51.1 %    MONOCYTES 9 1.7 - 9.3 %    EOSINOPHILS 1 0.9 - 2.9 %    BASOPHILS 0 0.0 - 2.5 %    ABS. NEUTROPHILS 3.2 1.8 - 7.7 K/UL    ABS. LYMPHOCYTES 0.5 (L) 1.0 - 4.8 K/UL    ABS. MONOCYTES 0.4 0.2 - 2.4 K/UL    ABS. EOSINOPHILS 0.0 0.0 - 0.7 K/UL    ABS. BASOPHILS 0.0 0.0 - 0.2 K/UL   METABOLIC PANEL, COMPREHENSIVE    Collection Time: 01/15/22  5:29 PM   Result Value Ref Range    Sodium 151 (H) 136 - 145 mmol/L    Potassium <1.0 (LL) 3.5 - 5.1 mmol/L    Chloride 123 (H) 97 - 108 mmol/L    CO2 12 (LL) 21 - 32 mmol/L    Anion gap 16 (H) 5 - 15 mmol/L    Glucose 45 (LL) 65 - 100 mg/dL    BUN 10 6 - 20 mg/dL    Creatinine 0.25 (L) 0.55 - 1.02 mg/dL    BUN/Creatinine ratio 40 (H) 12 - 20      GFR est AA >60 >60 ml/min/1.73m2    GFR est non-AA >60 >60 ml/min/1.73m2    Calcium <5.0 (LL) 8.5 - 10.1 mg/dL    Bilirubin, total <0.1 (L) 0.2 - 1.0 mg/dL    AST (SGOT) 7 (L) 15 - 37 U/L    ALT (SGPT) <6 (L) 12 - 78 U/L    Alk.  phosphatase 38 (L) 45 - 117 U/L    Protein, total <2.0 (L) 6.4 - 8.2 g/dL    Albumin <0.6 (L) 3.5 - 5.0 g/dL    Globulin Not calculated 2.0 - 4.0 g/dL    A-G Ratio Not calculated 1.1 - 2.2     TROPONIN-HIGH SENSITIVITY    Collection Time: 01/15/22  5:29 PM   Result Value Ref Range    Troponin-High Sensitivity 29 0 - 51 ng/L   LACTIC ACID    Collection Time: 01/15/22  5:29 PM   Result Value Ref Range    Lactic acid 0.7 0.4 - 2.0 mmol/L   URINALYSIS W/ RFLX MICROSCOPIC    Collection Time: 01/15/22  5:29 PM   Result Value Ref Range    Color Yellow/Straw      Appearance Clear Clear      Specific gravity 1.020 1.003 - 1.030      pH (UA) 6.0 5.0 - 8.0      Protein Negative Negative mg/dL    Glucose Negative Negative mg/dL    Ketone Negative Negative mg/dL    Bilirubin Negative Negative      Blood Trace (A) Negative      Urobilinogen 1.0 0.2 - 1.0 EU/dL    Nitrites Positive (A) Negative      Leukocyte Esterase Trace (A) Negative     URINE MICROSCOPIC    Collection Time: 01/15/22  5:29 PM   Result Value Ref Range    WBC 5-10 0 - 5 /hpf    RBC 0-5 0 - 3 /hpf    Bacteria 4+ (A) Negative /hpf    Amorphous Crystals 4+ (A) Negative   CULTURE, URINE    Collection Time: 01/15/22  5:29 PM    Specimen: Urine   Result Value Ref Range    Special Requests: No Special Requests      Baker Count >100,000  colonies/ml        Culture result: Gram Negative Rods (A)     TYPE & SCREEN    Collection Time: 01/15/22  5:58 PM   Result Value Ref Range    Crossmatch Expiration 01/18/2022,2359     ABO/Rh(D) Bernarda Lack Positive     Antibody screen Negative    GLUCOSE, POC    Collection Time: 01/15/22  6:32 PM   Result Value Ref Range    Glucose (POC) 311 (H) 65 - 117 mg/dL    Performed by KIM FELIX    METABOLIC PANEL, COMPREHENSIVE    Collection Time: 01/15/22  7:20 PM   Result Value Ref Range    Sodium 143 136 - 145 mmol/L    Potassium 3.1 (L) 3.5 - 5.1 mmol/L    Chloride 106 97 - 108 mmol/L    CO2 24 21 - 32 mmol/L    Anion gap 13 5 - 15 mmol/L    Glucose 188 (H) 65 - 100 mg/dL    BUN 24 (H) 6 - 20 mg/dL    Creatinine 1.06 (H) 0.55 - 1.02 mg/dL    BUN/Creatinine ratio 23 (H) 12 - 20      GFR est AA >60 >60 ml/min/1.73m2    GFR est non-AA 50 (L) >60 ml/min/1.73m2    Calcium 6.6 (L) 8.5 - 10.1 mg/dL    Bilirubin, total 0.5 0.2 - 1.0 mg/dL    AST (SGOT) 21 15 - 37 U/L    ALT (SGPT) 12 12 - 78 U/L    Alk.  phosphatase 110 45 - 117 U/L    Protein, total 5.2 (L) 6.4 - 8.2 g/dL    Albumin 2.0 (L) 3.5 - 5.0 g/dL    Globulin 3.2 2.0 - 4.0 g/dL    A-G Ratio 0.6 (L) 1.1 - 2.2     CBC WITH AUTOMATED DIFF    Collection Time: 01/15/22  9:24 PM   Result Value Ref Range    WBC 8.4 4.4 - 11.3 K/uL    RBC 4.19 (L) 4.50 - 5.90 M/uL    HGB 13.5 13.5 - 17.5 g/dL    HCT 38.6 36 - 46 %    MCV 92.2 80 - 100 FL    MCH 32.2 31 - 34 PG    MCHC 34.9 31.0 - 36.0 g/dL    RDW 13.9 11.5 - 14.5 %    PLATELET 094 040 - 559 K/uL    MPV 8.8 6.5 - 11.5 FL    NRBC 0.2  WBC    ABSOLUTE NRBC 0.01 K/uL    NEUTROPHILS 77 (H) 42 - 75 %    LYMPHOCYTES 11 (L) 20.5 - 51.1 %    MONOCYTES 10 (H) 1.7 - 9.3 %    EOSINOPHILS 1 0.9 - 2.9 %    BASOPHILS 1 0.0 - 2.5 %    ABS. NEUTROPHILS 6.5 1.8 - 7.7 K/UL    ABS. LYMPHOCYTES 0.9 (L) 1.0 - 4.8 K/UL    ABS. MONOCYTES 0.9 0.2 - 2.4 K/UL    ABS. EOSINOPHILS 0.1 0.0 - 0.7 K/UL    ABS. BASOPHILS 0.1 0.0 - 0.2 K/UL   CULTURE, BLOOD    Collection Time: 01/16/22 11:26 AM    Specimen: Blood   Result Value Ref Range    Special Requests: No Special Requests      Culture result: No growth after 8 hours     MAGNESIUM    Collection Time: 01/16/22 11:26 AM   Result Value Ref Range    Magnesium 1.4 (L) 1.6 - 2.4 mg/dL   CBC W/O DIFF    Collection Time: 01/17/22  5:30 AM   Result Value Ref Range    WBC 6.2 4.4 - 11.3 K/uL    RBC 3.90 (L) 4.50 - 5.90 M/uL    HGB 12.4 (L) 13.5 - 17.5 g/dL    HCT 36.2 36 - 46 %    MCV 92.8 80 - 100 FL    MCH 31.7 31 - 34 PG    MCHC 34.2 31.0 - 36.0 g/dL    RDW 14.2 11.5 - 14.5 %    PLATELET 923 (L) 615 - 400 K/uL    MPV 8.9 6.5 - 11.5 FL    NRBC 0.1  WBC    ABSOLUTE NRBC 8.35 K/uL   METABOLIC PANEL, BASIC    Collection Time: 01/17/22  5:30 AM   Result Value Ref Range    Sodium 141 136 - 145 mmol/L    Potassium 3.5 3.5 - 5.1 mmol/L    Chloride 109 (H) 97 - 108 mmol/L    CO2 23 21 - 32 mmol/L    Anion gap 9 5 - 15 mmol/L    Glucose 99 65 - 100 mg/dL    BUN 22 (H) 6 - 20 mg/dL    Creatinine 0.83 0.55 - 1.02 mg/dL    BUN/Creatinine ratio 27 (H) 12 - 20      GFR est AA >60 >60 ml/min/1.73m2    GFR est non-AA >60 >60 ml/min/1.73m2    Calcium 6.6 (L) 8.5 - 10.1 mg/dL        Imaging:  CT HEAD WO CONT    Result Date: 1/16/2022  1. No acute intracranial findings. 2. Atrophy and chronic appearing ischemic changes. 3. Pansinusitis.     XR CHEST PORT    Result Date: 1/15/2022  No acute cardiopulmonary process Assessment//Plan           Problem List:  Hospital Problems  Never Reviewed          Codes Class Noted POA    * (Principal) UTI (urinary tract infection) ICD-10-CM: N39.0  ICD-9-CM: 599.0  1/17/2022 Unknown        Dehydration ICD-10-CM: E86.0  ICD-9-CM: 276.51  1/15/2022 Unknown        Hypokalemia ICD-10-CM: E87.6  ICD-9-CM: 276.8  1/15/2022 Unknown        Anemia ICD-10-CM: D64.9  ICD-9-CM: 285.9  1/15/2022 Unknown        Hypoglycemia ICD-10-CM: E16.2  ICD-9-CM: 251.2  1/15/2022 Unknown            Gram-negative urinary tract infection  --Urine culture growing gram-negative rods  -Continue with IV Rocephin for 3 to 5 days pending urine culture speciation  -Received IV fluid hydration.     Severe hypokalemia resolved  -Potassium of 1 on admission with good response to supplementation now at 3.5  -Initial EKG with bigeminy and sinus tachycardia  -Magnesium of 1.4, replaced IV  -Recheck BMP in the morning.     Altered mental status  -secondary to urinary tract infection  -Head CT no acute intracranial findings.   Pansinusitis     Elevated blood pressure   -BPs have been elevated, improved  -Started antihypertensive agent     Skin lesions  -Rocephin for possible cellulitis  -Steroid cream to affected area     Anemia  -Etiology unclear but responded significantly well to 2 units with current hemoglobin of 13.5  -Obtain stool guaiac, iron studies  -Monitor H&H     COPD  -Not an active issue at this time     CODE STATUS  -Patient is full code     Disposition  -Patient's son plans to take her home upon discharge     Unable to get in contact with family      Prophylaxis: SCD and Lovenox 40 mg subcu daily     DNR        Electronically signed by Aspen Boone MD on 1/17/2022 at 12:48 PM

## 2022-01-17 NOTE — PROGRESS NOTES
Care Management Interventions  PCP Verified by CM: Yes (Dr. Sara Garnett- last seen about 2 months ago.)  Mode of Transport at Discharge: Other (see comment) (son)  Transition of Care Consult (CM Consult): [address]: No  Reason Outside [place]: Out of service area  Support Systems: Child(johnathan)  Confirm Follow Up Transport: Family  The Plan for Transition of Care is Related to the Following Treatment Goals : Patient lives alone with assistnce from her children and grandchildren. Plan to discharge home with family and home health services.   Discharge Location  Discharge Placement: Home with home health

## 2022-01-17 NOTE — PROGRESS NOTES
Spoke with son, Lucila Ponce, again. Initial discharge plan was to discharge patient home with home health and family assistance. Son now wants patient evaluated for potential SNF placement. Will notify provider.

## 2022-01-17 NOTE — PROGRESS NOTES
Patients case reviewed during interdisciplinary team meeting in [address] [place], [place] [address]Acute Care Unit. Rev.  Denton Delgado 40, [address]

## 2022-01-17 NOTE — PROGRESS NOTES
Spiritual Care Assessment/Progress Note  [facility]      NAME: Lelo Olivares      MRN: [id number]  AGE: [de-identified] y.o. SEX: female  Zoroastrianism Affiliation:    Language: English     1/17/2022     Total Time (in minutes): 12     Spiritual Assessment begun in [facility] [address] through conversation with:         [x]Patient        [x] Family    [] Friend(s)        Reason for Consult: Interdisciplinary rounds, patient floor     Spiritual beliefs: (Please include comment if needed)     [x] Identifies with a star tradition:  Francois Gitelman       [] Supported by a star community:            [] Claims no spiritual orientation:           [] Seeking spiritual identity:                [] Adheres to an individual form of spirituality:           [] Not able to assess:                           Identified resources for coping:      [] Prayer                               [] Music                  [] Guided Imagery     [x] Family/friends                 [] Pet visits     [] Devotional reading                         [] Unknown     [] Other:                                             Interventions offered during this visit: (See comments for more details)    Patient Interventions: Affirmation of emotions/emotional suffering,Affirmation of star,Zoroastrianism beliefs/image of God discussed     Family/Friend(s):  Affirmation of star,Affirmation of emotions/emotional suffering,Zoroastrianism beliefs/image of God discussed     Plan of Care:     [x] Support spiritual and/or cultural needs    [] Support AMD and/or advance care planning process      [] Support grieving process   [] Coordinate Rites and/or Rituals    [] Coordination with community clergy   [] No spiritual needs identified at this time   [] Detailed Plan of Care below (See Comments)  [] Make referral to Music Therapy  [] Make referral to Pet Therapy     [] Make referral to Addiction services  [] Make referral to [facility]  [] Make referral to Spiritual Care Partner  [] No future visits requested        [x] Contact Spiritual Care for further referrals     Comments: Visited with patient while rounding in [address] [place], [place] [address]Acute Care Unit. Patient's son was visiting as well. Patient's son shared that patient is Mormon, and he just [employer], and he is the youngest of three, and it's only boys. He says the family is expecting the first great great granddaughter this summer.  provided emotional and spiritual support, and ministry of presence. Advised of  availability. Advised nurse to contact [facility] for any further referrals. Rev.  Denton Cantu 68, [place]

## 2022-01-17 NOTE — ACP (ADVANCE CARE PLANNING)
Advance Care Planning     General Advance Care Planning (ACP) Conversation      Date of Conversation: 1/15/2022  Conducted with: Patient with Decision Making Capacity    Healthcare Decision Maker:     Primary Decision Maker: Gabino Sánchez - Son - [phone]  Click here to complete [place] including selection of the Healthcare Decision Maker Relationship (ie \"Primary\")      Today we documented Decision Maker(s) consistent with Legal Next of Kin hierarchy. Content/Action Overview:   DECLINED ACP conversation - will revisit periodically   Reviewed DNR/DNI and patient elects Full Code (Attempt Resuscitation)  Topics discussed: resuscitation preferences  Additional Comments: Patient was not interested in talking about ACP documents, but did state that she was a DNR.      Length of Voluntary ACP Conversation in minutes:  <16 minutes (Non-Billable)    Merle Khan

## 2022-01-17 NOTE — PROGRESS NOTES
Advance Care Planning     General Advance Care Planning (ACP) Conversation      Date of Conversation: 1/15/2022  Conducted with: Patient with Decision Making Capacity    Healthcare Decision Maker:     Primary Decision Maker: Rita Villarreal - Dom - [phone]  Click here to complete [address] including selection of the Healthcare Decision Maker Relationship (ie \"Primary\")      Today we documented Decision Maker(s) consistent with Legal Next of Kin hierarchy.     Content/Action Overview:   DECLINED ACP conversation - will revisit periodically   Reviewed DNR/DNI and patient confirms current DNR status - completed forms on file (place new order if needed)  Topics discussed: resuscitation preferences  Additional Comments: na     Length of Voluntary ACP Conversation in minutes:  <16 minutes (Non-Billable)    Kelton Dumont

## 2022-01-17 NOTE — ROUTINE PROCESS
Pt found scratching scabs and dry skin off of thighs and buttocks, discouraged since areas were wet and she was not utilizing good hygiene.

## 2022-01-17 NOTE — PROGRESS NOTES
Problem: Falls - Risk of  Goal: *Absence of Falls  Description: Document Vernia Colder Fall Risk and appropriate interventions in the flowsheet. 1/17/2022 0222 by Chelsey Varela RN  Outcome: Progressing Towards Goal  Note: Fall Risk Interventions:  Mobility Interventions: (P) Bed/chair exit alarm              Elimination Interventions: (P) Call light in reach,Toileting schedule/hourly rounds,Bed/chair exit alarm           1/17/2022 0222 by Chelsey Varela RN  Note: Fall Risk Interventions:  Mobility Interventions: (P) Bed/chair exit alarm              Elimination Interventions: (P) Call light in reach,Toileting schedule/hourly rounds,Bed/chair exit alarm              Problem: Pressure Injury - Risk of  Goal: *Prevention of pressure injury  Description: Document Jose Luis Scale and appropriate interventions in the flowsheet. Outcome: Progressing Towards Goal  Note: Pressure Injury Interventions:  Sensory Interventions: (P) Assess changes in LOC    Moisture Interventions: (P) Absorbent underpads,Apply protective barrier, creams and emollients,Internal/External urinary devices,Minimize layers,Check for incontinence Q2 hours and as needed    Activity Interventions: (P) Pressure redistribution bed/mattress(bed type)    Mobility Interventions: (P) HOB 30 degrees or less,Turn and reposition approx.  every two hours(pillow and wedges)    Nutrition Interventions: (P) Document food/fluid/supplement intake    Friction and Shear Interventions: (P) Apply protective barrier, creams and emollients,HOB 30 degrees or less,Minimize layers                Problem: Airway Clearance - Ineffective  Goal: *Patent airway  Outcome: Progressing Towards Goal     Problem: Impaired Skin Integrity/Pressure Injury Treatment  Goal: *Improvement of Existing Pressure Injury  Outcome: Progressing Towards Goal

## 2022-01-18 VITALS
HEIGHT: 70 IN | TEMPERATURE: 97.5 F | WEIGHT: 164.2 LBS | OXYGEN SATURATION: 95 % | RESPIRATION RATE: 20 BRPM | DIASTOLIC BLOOD PRESSURE: 54 MMHG | SYSTOLIC BLOOD PRESSURE: 131 MMHG | HEART RATE: 91 BPM | BODY MASS INDEX: 23.51 KG/M2

## 2022-01-18 LAB
ALBUMIN SERPL-MCNC: 2.4 G/DL (ref 3.5–5)
ANION GAP SERPL CALC-SCNC: 8 MMOL/L (ref 5–15)
BACTERIA SPEC CULT: ABNORMAL
BUN SERPL-MCNC: 16 MG/DL (ref 6–20)
BUN/CREAT SERPL: 21 (ref 12–20)
CA-I BLD-MCNC: 7.1 MG/DL (ref 8.5–10.1)
CHLORIDE SERPL-SCNC: 108 MMOL/L (ref 97–108)
CO2 SERPL-SCNC: 26 MMOL/L (ref 21–32)
COLONY COUNT,CNT: ABNORMAL
CREAT SERPL-MCNC: 0.75 MG/DL (ref 0.55–1.02)
ERYTHROCYTE [DISTWIDTH] IN BLOOD BY AUTOMATED COUNT: 13.8 % (ref 11.5–14.5)
FOLATE SERPL-MCNC: 2.6 NG/ML (ref 5–21)
GLUCOSE SERPL-MCNC: 90 MG/DL (ref 65–100)
HCT VFR BLD AUTO: 39.3 % (ref 36–46)
HEMOCCULT SP1 STL QL: NEGATIVE
HGB BLD-MCNC: 13.6 G/DL (ref 13.5–17.5)
IRON SERPL-MCNC: 49 UG/DL (ref 35–150)
MCH RBC QN AUTO: 32.1 PG (ref 31–34)
MCHC RBC AUTO-ENTMCNC: 34.5 G/DL (ref 31–36)
MCV RBC AUTO: 92.9 FL (ref 80–100)
NRBC # BLD: 0.01 K/UL
NRBC BLD-RTO: 0.1 PER 100 WBC
PLATELET # BLD AUTO: 139 K/UL (ref 150–400)
PMV BLD AUTO: 8.6 FL (ref 6.5–11.5)
POTASSIUM SERPL-SCNC: 4.1 MMOL/L (ref 3.5–5.1)
RBC # BLD AUTO: 4.23 M/UL (ref 4.5–5.9)
SODIUM SERPL-SCNC: 142 MMOL/L (ref 136–145)
SPECIAL REQUESTS,SREQ: ABNORMAL
TIBC SERPL-MCNC: 172 UG/DL (ref 250–450)
VIT B12 SERPL-MCNC: 224 PG/ML (ref 193–986)
WBC # BLD AUTO: 5.6 K/UL (ref 4.4–11.3)

## 2022-01-18 PROCEDURE — 74011250636 HC RX REV CODE- 250/636: Performed by: EMERGENCY MEDICINE

## 2022-01-18 PROCEDURE — 74011000258 HC RX REV CODE- 258: Performed by: INTERNAL MEDICINE

## 2022-01-18 PROCEDURE — 85027 COMPLETE CBC AUTOMATED: CPT

## 2022-01-18 PROCEDURE — 82040 ASSAY OF SERUM ALBUMIN: CPT

## 2022-01-18 PROCEDURE — 74011250637 HC RX REV CODE- 250/637: Performed by: INTERNAL MEDICINE

## 2022-01-18 PROCEDURE — 74011250636 HC RX REV CODE- 250/636: Performed by: INTERNAL MEDICINE

## 2022-01-18 PROCEDURE — 97161 PT EVAL LOW COMPLEX 20 MIN: CPT

## 2022-01-18 PROCEDURE — 80048 BASIC METABOLIC PNL TOTAL CA: CPT

## 2022-01-18 PROCEDURE — 74011000250 HC RX REV CODE- 250: Performed by: EMERGENCY MEDICINE

## 2022-01-18 RX ORDER — CIPROFLOXACIN 250 MG/1
250 TABLET, FILM COATED ORAL EVERY 12 HOURS
Qty: 10 TABLET | Refills: 0 | Status: SHIPPED | OUTPATIENT
Start: 2022-01-18

## 2022-01-18 RX ORDER — AMLODIPINE BESYLATE 2.5 MG/1
2.5 TABLET ORAL DAILY
Qty: 30 TABLET | Refills: 0 | Status: SHIPPED | OUTPATIENT
Start: 2022-01-19

## 2022-01-18 RX ADMIN — POTASSIUM CHLORIDE 20 MEQ: 1500 TABLET, EXTENDED RELEASE ORAL at 08:33

## 2022-01-18 RX ADMIN — CEFTRIAXONE SODIUM 1 G: 1 INJECTION, POWDER, FOR SOLUTION INTRAMUSCULAR; INTRAVENOUS at 10:29

## 2022-01-18 RX ADMIN — SODIUM CHLORIDE, PRESERVATIVE FREE 10 ML: 5 INJECTION INTRAVENOUS at 05:20

## 2022-01-18 RX ADMIN — ENOXAPARIN SODIUM 40 MG: 100 INJECTION SUBCUTANEOUS at 08:33

## 2022-01-18 RX ADMIN — AMLODIPINE BESYLATE 2.5 MG: 2.5 TABLET ORAL at 08:32

## 2022-01-18 RX ADMIN — TRIAMCINOLONE ACETONIDE: 1 CREAM TOPICAL at 08:34

## 2022-01-18 NOTE — PROGRESS NOTES
Patients case reviewed during interdisciplinary team meeting in [address] [place], [place] [address]Acute Care Unit. Rev.  Denton Mace 03, [address]

## 2022-01-18 NOTE — PROGRESS NOTES
Received call back from Godwin Reyna with [facility]. Stated that they could not officially accept the patient until a home visit is made to ensure patient qualifies. Son, Lucila Ponce, made aware that a home visit will be done to determine qualification for the hospice program. Son verbalized understanding. Primary nurse made aware that patient can be discharged home with the son.

## 2022-01-18 NOTE — DISCHARGE SUMMARY
Physician Discharge Summary       Patient: Stephan Boothe MRN: [id number]     YOB: 1940  Age: [de-identified] y.o. Sex: female    PCP: None    Allergies: Penicillins    Admit date: 1/15/2022  Admitting Provider: Yamilet Kinney MD    Discharge date: 1/18/2022  Discharging Provider: Brian Trujillo MD    * Admission Diagnoses:   Dehydration [E86.0]  Anemia [D64.9]  Hypokalemia [E87.6]  Hypoglycemia [E16.2]      * Discharge Diagnoses:    Hospital Problems as of 1/18/2022 Never Reviewed          Codes Class Noted - Resolved POA    * (Principal) UTI (urinary tract infection) ICD-10-CM: N39.0  ICD-9-CM: 599.0  1/17/2022 - Present Unknown        Dehydration ICD-10-CM: E86.0  ICD-9-CM: 276.51  1/15/2022 - Present Unknown        Hypokalemia ICD-10-CM: E87.6  ICD-9-CM: 276.8  1/15/2022 - Present Unknown        Anemia ICD-10-CM: D64.9  ICD-9-CM: 285.9  1/15/2022 - Present Unknown        Hypoglycemia ICD-10-CM: E16.2  ICD-9-CM: 251.2  1/15/2022 - Present Unknown                * Hospital Course:   Patient was admitted for gram-negative UTI, urine culture grew E. coli, was treated with IV Rocephin and transition to cefuroxime at discharge home. Started on blood pressure medications for elevated blood pressures. Also noted to have severe hypokalemia, which was replaced and at discharge was 3.5. She also received 2 units of packed red blood cells for anemia, hemoglobin was stable at discharge. Acute metabolic encephalopathy felt likely to be secondary to UTI. However patient was noted to have persistent confusion. Son also reported that patient has been noted to not want to participate in any activities at home. Family decided to take patient home on hospice.     CODE STATUS DNR/DNI    * Procedures:   * No surgery found *        Consults:   none    Vitals Last 24 Hours:  Patient Vitals for the past 24 hrs:   Temp Pulse Resp BP SpO2   01/18/22 1110 97.5 °F (36.4 °C) 91 20 (!) 131/54 95 %   01/18/22 0728 97.8 °F (36.6 °C) 73 16 (!) 153/61 95 %   01/18/22 0259 97.5 °F (36.4 °C) 71 18 (!) 166/75 97 %   01/17/22 2318 97.3 °F (36.3 °C) 81 20 (!) 160/80 95 %   01/17/22 1857 97.8 °F (36.6 °C) 91 20 (!) 153/73 94 %   01/17/22 1659 97.6 °F (36.4 °C) 85 14 (!) 181/72 96 %        Discharge Exam:  General: Alert, cooperative, no distress, appears stated age. Head:   Normocephalic, without obvious abnormality, atraumatic. Eyes:   Conjunctivae/corneas clear. Extraocular movements intact. Lungs:  Clear to auscultation bilaterally. no wheeze, rales, crackles, rhonchi   Chest wall: No tenderness or deformity. Heart:  Regular rate and rhythm, S1, S2 normal, no murmur, click, rub or gallop. Abdomen:  Soft, non-tender. Bowel sounds normal. No masses,  No organomegaly. Extremities: Extremities normal, atraumatic, no cyanosis or edema. Pulses: 2+ and symmetric all extremities. Skin: Skin color, texture, turgor normal. No rashes or lesions  Neurologic: Awake, Alert, oriented to herself .  No obvious gross sensory or motor deficits    Labs:  Recent Results (from the past 24 hour(s))   CBC W/O DIFF    Collection Time: 01/18/22  5:34 AM   Result Value Ref Range    WBC 5.6 4.4 - 11.3 K/uL    RBC 4.23 (L) 4.50 - 5.90 M/uL    HGB 13.6 13.5 - 17.5 g/dL    HCT 39.3 36 - 46 %    MCV 92.9 80 - 100 FL    MCH 32.1 31 - 34 PG    MCHC 34.5 31.0 - 36.0 g/dL    RDW 13.8 11.5 - 14.5 %    PLATELET 682 (L) 623 - 400 K/uL    MPV 8.6 6.5 - 11.5 FL    NRBC 0.1  WBC    ABSOLUTE NRBC 4.89 K/uL   METABOLIC PANEL, BASIC    Collection Time: 01/18/22  5:34 AM   Result Value Ref Range    Sodium 142 136 - 145 mmol/L    Potassium 4.1 3.5 - 5.1 mmol/L    Chloride 108 97 - 108 mmol/L    CO2 26 21 - 32 mmol/L    Anion gap 8 5 - 15 mmol/L    Glucose 90 65 - 100 mg/dL    BUN 16 6 - 20 mg/dL    Creatinine 0.75 0.55 - 1.02 mg/dL    BUN/Creatinine ratio 21 (H) 12 - 20      GFR est AA >60 >60 ml/min/1.73m2    GFR est non-AA >60 >60 ml/min/1.73m2    Calcium 7.1 (L) 8.5 - 10.1 mg/dL         Imaging:  CT HEAD WO CONT    Result Date: 1/16/2022  1. No acute intracranial findings. 2. Atrophy and chronic appearing ischemic changes. 3. Pansinusitis. XR CHEST PORT    Result Date: 1/15/2022  No acute cardiopulmonary process         * Discharge Condition: poor  Prognosis   * Disposition: Home w/Family with hospice       Discharge Medications:  Current Discharge Medication List      START taking these medications    Details   amLODIPine (NORVASC) 2.5 mg tablet Take 1 Tablet by mouth daily. Qty: 30 Tablet, Refills: 0  Start date: 1/19/2022      ciprofloxacin HCl (Cipro) 250 mg tablet Take 1 Tablet by mouth every twelve (12) hours. Indications: urinary tract infection due to E. coli bacteria  Qty: 10 Tablet, Refills: 0  Start date: 1/18/2022               * Follow-up Care/Patient Instructions:   Activity: Bedrest and as tolerated   Diet: Comfort feeding      Follow-up Information     Follow up With Specialties Details Why Contact Info    Anne Cooper NP Nurse Practitioner On 1/26/2022 @ [address] [place] [place] [address]  [phone]      None    None ([phone]) Patient stated that they have no PCP            Signed:  Lavern Watt MD  1/18/2022

## 2022-01-18 NOTE — PROGRESS NOTES
Spoke with son, Lydia John, again regarding discharge planning. Plan is to now discharge patient home with hospice. Son requested [facility]. Spoke with Cathy Gurrola at [address] regarding qualifying hospice diagnosis. Cathy Gurrola stated that an albumin level is needed. Will discuss with provider.

## 2022-01-18 NOTE — PROGRESS NOTES
Care Management Interventions  PCP Verified by CM: Yes  Mode of Transport at Discharge: Other (see comment) (son)  Transition of Care Consult (CM Consult): [address]: No  [facility]: No  Reason Outside [place]: Out of service area  Physical Therapy Consult: Yes  Support Systems: Child(johnathan)  Confirm Follow Up Transport: Family  The Plan for Transition of Care is Related to the Following Treatment Goals : Patient was discharged home with a referral to home hospice with [facility].   The Patient and/or Patient Representative was Provided with a Choice of Provider and Agrees with the Discharge Plan?: Yes  Name of the Patient Representative Who was Provided with a Choice of Provider and Agrees with the Discharge Plan: Lydia John- justice  Freedom of Choice List was Provided with Basic Dialogue that Supports the Patient's Individualized Plan of Care/Goals, Treatment Preferences and Shares the Quality Data Associated with the Providers?: Yes  Discharge Location  Discharge Placement: Home with hospice

## 2022-01-18 NOTE — PROGRESS NOTES
PHYSICAL THERAPY EVALUATION/DISCHARGE  Patient: Lima Acosta (22 y.o. female)  Date: 1/18/2022  Primary Diagnosis: Dehydration [E86.0]  Anemia [D64.9]  Hypokalemia [E87.6]  Hypoglycemia [E16.2]       Precautions: falls risk, failure to thrive         ASSESSMENT  Based on the objective data described below, the patient presents with failure to thrive and unwillingness to participate in therapy despite generalized weakness and functional mobility deficits. Patient exhibits decreased motivation to participate with mobility despite therapist providing maximal verbal cues and coaxing to encourage participation. At this time, the patient is not a candidate for physical therapy due to her unwillingness to participate. Other factors to consider for discharge: failure to thrive     Further skilled acute physical therapy is not indicated at this time. PLAN :  Recommendation for discharge: (in order for the patient to meet his/her long term goals)  No skilled physical therapy/ follow up rehabilitation needs identified at this time. This discharge recommendation:  Has been made in collaboration with the attending provider and/or case management    IF patient discharges home will need the following DME: patient owns DME required for discharge       SUBJECTIVE:   Patient stated I don't feel well.  \"I don't care. \" \"It doesn't matter. \"    OBJECTIVE DATA SUMMARY:   HISTORY:    History reviewed. No pertinent past medical history. History reviewed. No pertinent surgical history. Prior level of function: Patient lived at home with son who cares for her. However, patient was a poor historian and unwilling to verbally respond and further information was unable to be obtained.   Personal factors and/or comorbidities impacting plan of care: failure to thrive    Home Situation  Home Environment: Private residence  [address] [place] [address] St: Two story  Living Alone: No  Support Systems: Child(johnahtan)  Patient Expects to be Discharged to[de-identified] House  Current DME Used/Available at Home: None (uses furniture to get around)    EXAMINATION/PRESENTATION/DECISION MAKING:   Critical Behavior:     Orientation Level: Oriented to place,Oriented to situation  Cognition: Decreased attention/concentration,Poor safety awareness     Hearing: Auditory  Auditory Impairment: None  Range Of Motion:    Gross ROM intact                       Strength:      Patient demonstrated difficulty with bed mobility requiring max assistance indicating a decreased gross strength. Functional Mobility:  Bed Mobility:  Rolling: Moderate assistance  Supine to Sit: Maximum assistance     Scooting: Maximum assistance  Transfers:   unable to perform due to patient unwillingness to participate                            Treatment Session:  Therapist attempted bed mobility training with rolling and supine to sit transfer with patient providing minimal effort and therapist providing maximal assistance and excessive verbal cues and coaxing to encourage participation. Physical Therapy Evaluation Charge Determination   History Examination Presentation Decision-Making   MEDIUM  Complexity : 1-2 comorbidities / personal factors will impact the outcome/ POC  LOW Complexity : 1-2 Standardized tests and measures addressing body structure, function, activity limitation and / or participation in recreation  LOW Complexity : Stable, uncomplicated  Low      Based on the above components, the patient evaluation is determined to be of the following complexity level: LOW     Pain Ratin/10    Activity Tolerance:   Poor      After treatment patient left in no apparent distress:   Supine in bed and Call bell within reach    COMMUNICATION/EDUCATION:   The patients plan of care was discussed with: Physical therapist, Physician and Case management. Patient is unable to participate in goal setting and plan of care.     Thank you for this referral.  Krys Russo, PT   Time Calculation: 15 mins

## 2022-01-18 NOTE — PROGRESS NOTES
Problem: Falls - Risk of  Goal: *Absence of Falls  Description: Document Boon Fail Fall Risk and appropriate interventions in the flowsheet. Outcome: Progressing Towards Goal  Note: Fall Risk Interventions:  Mobility Interventions: Bed/chair exit alarm,Communicate number of staff needed for ambulation/transfer,Patient to call before getting OOB,PT Consult for assist device competence    Mentation Interventions: Bed/chair exit alarm,Door open when patient unattended,Reorient patient,Toileting rounds         Elimination Interventions: Call light in reach,Elevated toilet seat,Toileting schedule/hourly rounds              Problem: Patient Education: Go to Patient Education Activity  Goal: Patient/Family Education  Outcome: Progressing Towards Goal     Problem: Pressure Injury - Risk of  Goal: *Prevention of pressure injury  Description: Document Jose Luis Scale and appropriate interventions in the flowsheet. Outcome: Progressing Towards Goal  Note: Pressure Injury Interventions:      Moisture Interventions: Absorbent underpads,Moisture barrier,Internal/External urinary devices,Apply protective barrier, creams and emollients    Activity Interventions: Pressure redistribution bed/mattress(bed type)    Mobility Interventions: HOB 30 degrees or less,Float heels,Turn and reposition approx.  every two hours(pillow and wedges)    Nutrition Interventions: Document food/fluid/supplement intake    Friction and Shear Interventions: Apply protective barrier, creams and emollients,HOB 30 degrees or less,Minimize layers                Problem: Airway Clearance - Ineffective  Goal: *Patent airway  Outcome: Progressing Towards Goal     Problem: Impaired Skin Integrity/Pressure Injury Treatment  Goal: *Improvement of Existing Pressure Injury  Outcome: Progressing Towards Goal     Problem: Impaired Skin Integrity/Pressure Injury Treatment  Goal: *Prevention of pressure injury  Description: Document Jose Luis Scale and appropriate interventions in the flowsheet. Outcome: Progressing Towards Goal  Note: Pressure Injury Interventions:  Sensory Interventions: Assess changes in LOC,Avoid rigorous massage over bony prominences,Keep linens dry and wrinkle-free,Maintain/enhance activity level,Turn and reposition approx. every two hours (pillows and wedges if needed)    Moisture Interventions: Absorbent underpads,Moisture barrier,Internal/External urinary devices,Apply protective barrier, creams and emollients    Activity Interventions: Pressure redistribution bed/mattress(bed type)    Mobility Interventions: HOB 30 degrees or less,Float heels,Turn and reposition approx.  every two hours(pillow and wedges)    Nutrition Interventions: Document food/fluid/supplement intake    Friction and Shear Interventions: Apply protective barrier, creams and emollients,HOB 30 degrees or less,Minimize layers

## 2022-01-18 NOTE — PROGRESS NOTES
Patient has been discharged to home with son, IV removed, huerta catheter removed, telemetry box returned

## 2022-01-18 NOTE — PROGRESS NOTES
Reason for Admission:  UTI, Dehydration, Anemia                     RUR Score:  12- low risk                   Plan for utilizing home health:  Home hospice        PCP: First and Last name:  None- Marce Beth NP     Name of Practice: [facility]   Are you a current patient: Yes/No: Yes   Approximate date of last visit: unknown   Can you participate in a virtual visit with your PCP: No                    Current Advanced Directive/Advance Care Plan: DNR      Healthcare Decision Maker:   Click here to complete [address] including selection of the [address] Relationship (ie \"Primary\")             Primary Decision Maker: Danae Ren - Son - [phone]                  Transition of Care Plan:   Patient will be discharged home with assistance from her family and a referral to hospice. Patient refused SNF placement.

## 2022-01-21 LAB
BACTERIA SPEC CULT: NORMAL
SPECIAL REQUESTS,SREQ: NORMAL